# Patient Record
Sex: FEMALE | Race: BLACK OR AFRICAN AMERICAN | NOT HISPANIC OR LATINO | Employment: FULL TIME | URBAN - METROPOLITAN AREA
[De-identification: names, ages, dates, MRNs, and addresses within clinical notes are randomized per-mention and may not be internally consistent; named-entity substitution may affect disease eponyms.]

---

## 2017-01-04 ENCOUNTER — OFFICE VISIT (OUTPATIENT)
Dept: RETAIL CLINIC | Facility: CLINIC | Age: 32
End: 2017-01-04

## 2017-01-04 VITALS
OXYGEN SATURATION: 97 % | HEART RATE: 78 BPM | RESPIRATION RATE: 18 BRPM | TEMPERATURE: 98 F | DIASTOLIC BLOOD PRESSURE: 82 MMHG | SYSTOLIC BLOOD PRESSURE: 120 MMHG

## 2017-01-04 DIAGNOSIS — J20.9 ACUTE BRONCHITIS, UNSPECIFIED ORGANISM: ICD-10-CM

## 2017-01-04 DIAGNOSIS — H65.02 ACUTE SEROUS OTITIS MEDIA OF LEFT EAR, RECURRENCE NOT SPECIFIED: Primary | ICD-10-CM

## 2017-01-04 PROCEDURE — 99213 OFFICE O/P EST LOW 20 MIN: CPT | Performed by: NURSE PRACTITIONER

## 2017-01-04 RX ORDER — METHYLPREDNISOLONE 4 MG/1
TABLET ORAL
Qty: 21 TABLET | Refills: 0 | Status: SHIPPED | OUTPATIENT
Start: 2017-01-04 | End: 2017-08-08

## 2017-01-04 RX ORDER — ALBUTEROL SULFATE 90 UG/1
2 AEROSOL, METERED RESPIRATORY (INHALATION) EVERY 4 HOURS PRN
Qty: 1 INHALER | Refills: 11 | Status: SHIPPED | OUTPATIENT
Start: 2017-01-04 | End: 2017-02-03

## 2017-01-04 RX ORDER — DEXTROMETHORPHAN HYDROBROMIDE AND PROMETHAZINE HYDROCHLORIDE 15; 6.25 MG/5ML; MG/5ML
5 SYRUP ORAL 4 TIMES DAILY PRN
Qty: 118 ML | Refills: 0 | Status: SHIPPED | OUTPATIENT
Start: 2017-01-04 | End: 2017-01-11

## 2017-01-04 RX ORDER — AZITHROMYCIN 250 MG/1
TABLET, FILM COATED ORAL
Qty: 6 TABLET | Refills: 0 | Status: SHIPPED | OUTPATIENT
Start: 2017-01-04 | End: 2017-08-08

## 2017-01-04 NOTE — MR AVS SNAPSHOT
Barbie MERCED Taylor   1/4/2017 3:00 PM   Office Visit    Dept Phone:  [phone]   Encounter #:  [id number]    Provider:  PROVIDER JOSE ANGEL CHAVEZ   Department:  [facility]                Your Full Care Plan              Today's Medication Changes          These changes are accurate as of: 1/4/17  3:20 PM.  If you have any questions, ask your nurse or doctor.               New Medication(s)Ordered:     albuterol 108 (90 BASE) MCG/ACT inhaler   Commonly known as:  PROVENTIL HFA;VENTOLIN HFA   Inhale 2 puffs Every 4 (Four) Hours As Needed for wheezing for up to 30 days.       azithromycin 250 MG tablet   Commonly known as:  ZITHROMAX Z-MORENITA   Take 2 tablets the first day, then 1 tablet daily for 4 days.       MethylPREDNISolone 4 MG tablet   Commonly known as:  MEDROL (MORENITA)   Take as directed on package instructions.       promethazine-dextromethorphan 6.25-15 MG/5ML syrup   Commonly known as:  PROMETHAZINE-DM   Take 5 mL by mouth 4 (Four) Times a Day As Needed for cough for up to 7 days.            Where to Get Your Medications      These medications were sent to [address] [facility] [phone] [place] [phone]   [address] [place] [address]     Phone:  [phone]     albuterol 108 (90 BASE) MCG/ACT inhaler    azithromycin 250 MG tablet    MethylPREDNISolone 4 MG tablet    promethazine-dextromethorphan 6.25-15 MG/5ML syrup                  Your Updated Medication List          This list is accurate as of: 1/4/17  3:20 PM.  Always use your most recent med list.                albuterol 108 (90 BASE) MCG/ACT inhaler   Commonly known as:  PROVENTIL HFA;VENTOLIN HFA   Inhale 2 puffs Every 4 (Four) Hours As Needed for wheezing for up to 30 days.       azithromycin 250 MG tablet   Commonly known as:  ZITHROMAX Z-MORENITA   Take 2 tablets the first day, then 1 tablet daily for 4 days.       ibuprofen 600 MG tablet   Commonly known as:   ADVIL,MOTRIN   1 tab every 6 hours       MethylPREDNISolone 4 MG tablet   Commonly known as:  MEDROL (MORENITA)   Take as directed on package instructions.       promethazine-dextromethorphan 6.25-15 MG/5ML syrup   Commonly known as:  PROMETHAZINE-DM   Take 5 mL by mouth 4 (Four) Times a Day As Needed for cough for up to 7 days.               You Were Diagnosed With        Codes Comments    Acute bronchiolitis due to unspecified organism    -  Primary ICD-10-CM: J21.9     Acute serous otitis media of left ear, recurrence not specified     ICD-10-CM: H65.02  ICD-9-CM: 381.01       Instructions     None    Patient Instructions History      Upcoming Appointments     Visit Type Date Time Department    OFFICE VISIT 2017  3:00 PM [facility]      [employer] Signup     [facility] [employer] allows you to send messages to your doctor, view your test results, renew your prescriptions, schedule appointments, and more. To sign up, go to [employer] and click on the Sign Up Now link in the New User? box. Enter your [employer] Activation Code exactly as it appears below along with the last four digits of your Social Security Number and your Date of Birth () to complete the sign-up process. If you do not sign up before the expiration date, you must request a new code.    [employer] Activation Code: [email or web address]  Expires: 2017  3:20 PM    If you have questions, you can email [email or web address] or call [phone] to talk to our [employer] staff. Remember, [employer] is NOT to be used for urgent needs. For medical emergencies, dial 911.               Other Info from Your Visit           Allergies     No Known Allergies      Vital Signs     Blood Pressure Pulse Temperature Respirations Oxygen Saturation Smoking Status    120/82 (BP Location: Right arm) 78 98 °F (36.7 °C) (Oral) 18 97% Passive Smoke Exposure - Never Smoker      Problems and Diagnoses Noted     Infection of airways    -  Primary     Middle ear infection

## 2017-01-04 NOTE — PROGRESS NOTES
Subjective   Barbie Taylor is a 31 y.o. female. Presenting with cold like symptoms since yesterday. States the symptoms originally began at the end of December. Was exposed to Flu with daughter; Bronchitis with patient at work. She has been taking rescue inhaler that was previously, sudafed and robitussin with no improvement. Believes that she ran a fever yesterday. See ROS.   History of Present Illness     The following portions of the patient's history were reviewed and updated as appropriate: allergies, current medications, past family history, past medical history, past social history, past surgical history and problem list.    Review of Systems   Constitutional: Positive for appetite change, fatigue and fever (subjective). Negative for chills.   HENT: Positive for congestion, ear pain, postnasal drip, rhinorrhea and sinus pressure.    Eyes: Positive for discharge (watery ).   Gastrointestinal: Negative.    Neurological: Positive for headaches.       Objective   Physical Exam   Constitutional: She appears well-developed.   HENT:   Head: Normocephalic.   Right Ear: Tympanic membrane and ear canal normal.   Left Ear: Tympanic membrane is erythematous and bulging.   Nose: Mucosal edema present.   Mouth/Throat: Uvula is midline and mucous membranes are normal. Posterior oropharyngeal erythema present. No tonsillar exudate.   Cardiovascular: Normal rate and regular rhythm.    Pulmonary/Chest: Effort normal and breath sounds normal.   Lymphadenopathy:        Head (right side): No tonsillar adenopathy present.        Head (left side): No tonsillar adenopathy present.     She has no cervical adenopathy.   Neurological: She is alert.   Skin: Skin is warm, dry and intact.       Assessment/Plan   Diagnoses and all orders for this visit:    Acute bronchiolitis due to unspecified organism    Acute serous otitis media of left ear, recurrence not specified    Other orders  -     azithromycin (ZITHROMAX Z-MORENITA) 250 MG  tablet; Take 2 tablets the first day, then 1 tablet daily for 4 days.  -     MethylPREDNISolone (MEDROL, MORENITA,) 4 MG tablet; Take as directed on package instructions.  -     albuterol (PROVENTIL HFA;VENTOLIN HFA) 108 (90 BASE) MCG/ACT inhaler; Inhale 2 puffs Every 4 (Four) Hours As Needed for wheezing for up to 30 days.  -     promethazine-dextromethorphan (PROMETHAZINE-DM) 6.25-15 MG/5ML syrup; Take 5 mL by mouth 4 (Four) Times a Day As Needed for cough for up to 7 days.        Visit information reviewed with patient, including medication prescribed and patient instructions. All questions answered and given to patient/and or caregiver.     If symptoms worsen with fever >103, please seek further evaluation in the ER. Please F/U with PCP with concerns/and questions.     Cintia Marshall, APRN

## 2017-08-08 ENCOUNTER — OFFICE VISIT (OUTPATIENT)
Dept: RETAIL CLINIC | Facility: CLINIC | Age: 32
End: 2017-08-08

## 2017-08-08 VITALS
SYSTOLIC BLOOD PRESSURE: 112 MMHG | HEART RATE: 84 BPM | TEMPERATURE: 98.1 F | DIASTOLIC BLOOD PRESSURE: 72 MMHG | RESPIRATION RATE: 18 BRPM

## 2017-08-08 DIAGNOSIS — Z72.0 TOBACCO ABUSE: ICD-10-CM

## 2017-08-08 DIAGNOSIS — J06.9 ACUTE URI: Primary | ICD-10-CM

## 2017-08-08 PROCEDURE — 99213 OFFICE O/P EST LOW 20 MIN: CPT | Performed by: NURSE PRACTITIONER

## 2017-08-08 RX ORDER — AZITHROMYCIN 250 MG/1
TABLET, FILM COATED ORAL
Qty: 6 TABLET | Refills: 0 | Status: SHIPPED | OUTPATIENT
Start: 2017-08-08 | End: 2019-12-10 | Stop reason: ALTCHOICE

## 2017-08-08 RX ORDER — DEXTROMETHORPHAN HYDROBROMIDE AND PROMETHAZINE HYDROCHLORIDE 15; 6.25 MG/5ML; MG/5ML
5 SYRUP ORAL 2 TIMES DAILY
Qty: 5 ML | Refills: 0 | Status: SHIPPED | OUTPATIENT
Start: 2017-08-08 | End: 2017-08-15

## 2017-08-08 NOTE — PROGRESS NOTES
Subjective   Barbie Taylor is a 32 y.o. female.     HPI Comments: 3 days duration of congestion and cough. Temp yesterday 99.0. Using theraflu, mucinex, cough drops.        The following portions of the patient's history were reviewed and updated as appropriate: allergies, current medications, past family history, past medical history, past social history and past surgical history.    Review of Systems   Constitutional: Positive for appetite change, chills, fatigue and fever (99.0).   HENT: Positive for congestion, ear pain, sinus pressure and sore throat.    Respiratory: Positive for cough and wheezing. Negative for shortness of breath.        Objective   Physical Exam   Constitutional: She appears well-developed.   HENT:   Head: Normocephalic.   Right Ear: Tympanic membrane normal.   Left Ear: Tympanic membrane normal.   Nose: Mucosal edema present.   Mouth/Throat: Posterior oropharyngeal erythema present.   Eyes: Pupils are equal, round, and reactive to light.   Neck: Normal range of motion.   Cardiovascular: Normal rate and regular rhythm.    Pulmonary/Chest: Effort normal and breath sounds normal.       Assessment/Plan   Diagnoses and all orders for this visit:    Acute URI    Tobacco abuse    Other orders  -     azithromycin (ZITHROMAX) 250 MG tablet; Take 2 tablets the first day, then 1 tablet daily for 4 days.  -     promethazine-dextromethorphan (PROMETHAZINE-DM) 6.25-15 MG/5ML syrup; Take 5 mL by mouth 2 (Two) Times a Day for 7 days.

## 2019-12-10 ENCOUNTER — OFFICE VISIT (OUTPATIENT)
Dept: OBSTETRICS AND GYNECOLOGY | Facility: CLINIC | Age: 34
End: 2019-12-10

## 2019-12-10 VITALS
DIASTOLIC BLOOD PRESSURE: 74 MMHG | WEIGHT: 145 LBS | SYSTOLIC BLOOD PRESSURE: 120 MMHG | RESPIRATION RATE: 14 BRPM | BODY MASS INDEX: 24.89 KG/M2

## 2019-12-10 DIAGNOSIS — Z01.419 WELL WOMAN EXAM WITH ROUTINE GYNECOLOGICAL EXAM: Primary | ICD-10-CM

## 2019-12-10 DIAGNOSIS — Z30.431 IUD CHECK UP: ICD-10-CM

## 2019-12-10 DIAGNOSIS — L70.8 OTHER ACNE: ICD-10-CM

## 2019-12-10 PROCEDURE — 99395 PREV VISIT EST AGE 18-39: CPT | Performed by: OBSTETRICS & GYNECOLOGY

## 2019-12-10 NOTE — PROGRESS NOTES
Subjective   Chief Complaint   Patient presents with   • Gynecologic Exam     Having skin issues would like a referral to see Derm     Barbie BLACKWOOD Claudia is a 34 y.o. year old  presenting to be seen for her annual exam.  Patient is using Mirena IUD for birth control.  This needs to be replaced in February and she will return and have it removed and replaced.  She is having no GYN problems.  Patient wants a referral to dermatology for her acne.  The IUD as a source of the acne was discussed.    SEXUAL Hx:  She is currently sexually active.  In the past year there has not been new sexual partners.    Condoms are not typically used.  She would not like to be screened for STD's at today's exam.  Current birth control method: IUD - Mirena.  She is happy with her current method of contraception and does not want to discuss alternative methods of contraception.  MENSTRUAL Hx:  No LMP recorded (lmp unknown). (Menstrual status: Oral contraceptives).  In the past 6 months her cycles have been absent.  Her menstrual flow has been absent.   Each month on average there are roughly 0 day(s) of very heavy flow.    Intermenstrual bleeding is absent.    Post-coital bleeding is absent.  Dysmenorrhea: none  PMS: none  Her cycles are not a source of concern for her that she wishes to discuss today.  HEALTH Hx:  She exercises regularly:yes.  She wears her seat belt:yes.  She has concerns about domestic violence: no.  OTHER COMPLAINTS:  Nothing else    The following portions of the patient's history were reviewed and updated as appropriate:problem list, current medications, allergies, past family history, past medical history, past social history and past surgical history.    Social History    Tobacco Use      Smoking status: Former Smoker        Types: Cigarettes        Quit date:         Years since quittin.9      Smokeless tobacco: Never Used    Review of Systems  Review of Systems - History obtained from the  patient  General ROS: negative  Psychological ROS: negative  ENT ROS: negative  Allergy and Immunology ROS: negative  Hematological and Lymphatic ROS: negative  Endocrine ROS: negative  Breast ROS: negative for breast lumps  Respiratory ROS: no cough, shortness of breath, or wheezing  Cardiovascular ROS: no chest pain or dyspnea on exertion  Gastrointestinal ROS: no abdominal pain, change in bowel habits, or black or bloody stools  Genito-Urinary ROS: no dysuria, trouble voiding, or hematuria  Musculoskeletal ROS: negative  Neurological ROS: no TIA or stroke symptoms  Dermatological ROS: negative        Objective   /74   Resp 14   Wt 65.8 kg (145 lb)   LMP  (LMP Unknown) Comment: Mirena IUD  Breastfeeding No   BMI 24.89 kg/m²      General:  well developed; well nourished  no acute distress   Skin:  No suspicious lesions seen   Thyroid: normal to inspection and palpation   Breasts:  Examined in supine position  Symmetric without masses or skin dimpling  Nipples normal without inversion, lesions or discharge  There are no palpable axillary nodes   Abdomen: soft, non-tender; no masses  no umbilical or inguinal hernias are present  no hepato-splenomegaly   Heart: regular rate and rhythm, S1, S2 normal, no murmur, click, rub or gallop   Lungs: clear to auscultation   Pelvis: Clinical staff was present for exam  External genitalia:  normal appearance of the external genitalia including Bartholin's and Chamblee's glands.  :  urethral meatus normal;  Vaginal:  normal pink mucosa without prolapse or lesions.  Cervix:  normal appearance. IUD string present - 1 cms in length; Pap smear was done  Uterus:  normal size, shape and consistency. anteverted;  Adnexa:  normal bimanual exam of the adnexa.  Rectal:  digital rectal exam not performed; anus visually normal appearing.          Assessment/Plan   Barbie was seen today for gynecologic exam.    Diagnoses and all orders for this visit:    Well woman exam with  routine gynecological exam  -     Liquid-based Pap Smear, Screening    IUD check up    Other acne  -     Ambulatory Referral to Dermatology         Return in February 2020 for removal and replacement of Mirena IUD    Addison Michael MD  12/10/2019  This note was electronically signed.    Addison Michael MD   December 10, 2019

## 2020-03-16 ENCOUNTER — TELEPHONE (OUTPATIENT)
Dept: OBSTETRICS AND GYNECOLOGY | Facility: CLINIC | Age: 35
End: 2020-03-16

## 2020-03-17 ENCOUNTER — TELEPHONE (OUTPATIENT)
Dept: OBSTETRICS AND GYNECOLOGY | Facility: CLINIC | Age: 35
End: 2020-03-17

## 2020-03-26 ENCOUNTER — OFFICE VISIT (OUTPATIENT)
Dept: OBSTETRICS AND GYNECOLOGY | Facility: CLINIC | Age: 35
End: 2020-03-26

## 2020-03-26 VITALS
DIASTOLIC BLOOD PRESSURE: 62 MMHG | BODY MASS INDEX: 25.94 KG/M2 | HEIGHT: 63 IN | SYSTOLIC BLOOD PRESSURE: 110 MMHG | WEIGHT: 146.4 LBS | RESPIRATION RATE: 14 BRPM

## 2020-03-26 DIAGNOSIS — Z30.430 ENCOUNTER FOR IUD INSERTION: ICD-10-CM

## 2020-03-26 DIAGNOSIS — Z30.432 ENCOUNTER FOR IUD REMOVAL: Primary | ICD-10-CM

## 2020-03-26 PROCEDURE — 58300 INSERT INTRAUTERINE DEVICE: CPT | Performed by: OBSTETRICS & GYNECOLOGY

## 2020-03-26 PROCEDURE — 58301 REMOVE INTRAUTERINE DEVICE: CPT | Performed by: OBSTETRICS & GYNECOLOGY

## 2020-03-26 NOTE — PROGRESS NOTES
IUD Removal and Immediate Reinsertion    No LMP recorded. Patient has had an implant.    Date of procedure:  3/26/2020    Risks and benefits discussed? yes  All questions answered? yes  Consents given by the patient  Written consent obtained? yes  Reason for removal: Device expiration    Local anesthesia used:  yes -gel was applied to the cervix      Procedure documentation:    A speculum was placed in order to view the cervix.  A tenaculum did not need to be placed on the anterior cervical lip.  Cervical dilation did not need to be performed in order to access the string.  The IUD string was easily seen.  The string was grasped and the IUD was removed without difficulty.  The IUD did not appear to be adherent to the uterine cavity. It was removed intact.    A sterile speculum was replaced and the cervix was cleansed with an antiseptic solution.  Vaginal discharge was scant.  The anterior lip of the cervix was grasped with a tenaculum and the uterine cavity was gently sounded.  There was mild difficulty passing the sound through the cervix.  Cervical dilation did need to be performed prior to placing the IUD.  The uterus was anteverted and sounded to 6 cms.  The Mirena was then prepared per the manufacturers instructions.    The Mirena was advanced to a point 2 cms from the fundus and then the arms were released from the sheath.  The device was advanced to the fundus and the device was released fully from the sheath.. The string was cut 2 cms in length.  Bleeding from the cervix was scant.    She tolerated the procedure without any difficulty.     Post procedure instructions: It was reviewed that the Mirena will not alter the timing of when she bleeds but it may decrease the quantity of flow and cramps.  Roughly 30% of people will be amenorrheic over time.  Efficacy rate of 99.2% over 5 years was discussed.  Spontaneous expulsion rate of 1-2% was also discussed.  If she has any issue with fever or excessive bleeding  or pain she is to call the office immediately.  Otherwise I would like to see her back in 4 weeks with an ultrasound to confirm correct placement.    This note was electronically signed.    Addison Michael MD    March 26, 2020

## 2020-05-29 ENCOUNTER — TELEPHONE (OUTPATIENT)
Dept: OBSTETRICS AND GYNECOLOGY | Facility: CLINIC | Age: 35
End: 2020-05-29

## 2020-06-09 ENCOUNTER — TELEPHONE (OUTPATIENT)
Dept: OBSTETRICS AND GYNECOLOGY | Facility: CLINIC | Age: 35
End: 2020-06-09

## 2023-04-14 ENCOUNTER — PATIENT ROUNDING (BHMG ONLY) (OUTPATIENT)
Dept: FAMILY MEDICINE CLINIC | Facility: CLINIC | Age: 38
End: 2023-04-14
Payer: COMMERCIAL

## 2023-04-14 NOTE — PROGRESS NOTES
[facility] a My-Chart message has been sent to the patient for PATIENT ROUNDING with a My chart message

## 2024-02-16 ENCOUNTER — PATIENT ROUNDING (BHMG ONLY) (OUTPATIENT)
Dept: FAMILY MEDICINE CLINIC | Facility: CLINIC | Age: 39
End: 2024-02-16
Payer: COMMERCIAL

## 2024-08-21 ENCOUNTER — OFFICE VISIT (OUTPATIENT)
Dept: FAMILY MEDICINE CLINIC | Facility: CLINIC | Age: 39
End: 2024-08-21
Payer: COMMERCIAL

## 2024-08-21 ENCOUNTER — LAB (OUTPATIENT)
Dept: LAB | Facility: HOSPITAL | Age: 39
End: 2024-08-21
Payer: COMMERCIAL

## 2024-08-21 VITALS
DIASTOLIC BLOOD PRESSURE: 88 MMHG | OXYGEN SATURATION: 98 % | BODY MASS INDEX: 23.92 KG/M2 | HEIGHT: 63 IN | WEIGHT: 135 LBS | SYSTOLIC BLOOD PRESSURE: 136 MMHG | HEART RATE: 77 BPM

## 2024-08-21 DIAGNOSIS — Z12.31 ENCOUNTER FOR SCREENING MAMMOGRAM FOR MALIGNANT NEOPLASM OF BREAST: ICD-10-CM

## 2024-08-21 DIAGNOSIS — R55 PRE-SYNCOPE: ICD-10-CM

## 2024-08-21 DIAGNOSIS — R42 DIZZINESS: ICD-10-CM

## 2024-08-21 DIAGNOSIS — Z76.89 ENCOUNTER TO ESTABLISH CARE: Primary | ICD-10-CM

## 2024-08-21 DIAGNOSIS — R71.8 ELEVATED MCV: ICD-10-CM

## 2024-08-21 DIAGNOSIS — Z13.220 SCREENING FOR LIPID DISORDERS: ICD-10-CM

## 2024-08-21 DIAGNOSIS — R00.2 PALPITATIONS: ICD-10-CM

## 2024-08-21 DIAGNOSIS — Z76.89 ENCOUNTER TO ESTABLISH CARE: ICD-10-CM

## 2024-08-21 LAB
ALBUMIN SERPL-MCNC: 4.6 G/DL (ref 3.5–5.2)
ALBUMIN/GLOB SERPL: 1.4 G/DL
ALP SERPL-CCNC: 69 U/L (ref 39–117)
ALT SERPL W P-5'-P-CCNC: 20 U/L (ref 1–33)
ANION GAP SERPL CALCULATED.3IONS-SCNC: 10.2 MMOL/L (ref 5–15)
AST SERPL-CCNC: 21 U/L (ref 1–32)
BASOPHILS # BLD AUTO: 0.04 10*3/MM3 (ref 0–0.2)
BASOPHILS NFR BLD AUTO: 0.6 % (ref 0–1.5)
BILIRUB SERPL-MCNC: 0.3 MG/DL (ref 0–1.2)
BUN SERPL-MCNC: 12 MG/DL (ref 6–20)
BUN/CREAT SERPL: 13.3 (ref 7–25)
CALCIUM SPEC-SCNC: 9.6 MG/DL (ref 8.6–10.5)
CHLORIDE SERPL-SCNC: 103 MMOL/L (ref 98–107)
CHOLEST SERPL-MCNC: 188 MG/DL (ref 0–200)
CO2 SERPL-SCNC: 23.8 MMOL/L (ref 22–29)
CREAT SERPL-MCNC: 0.9 MG/DL (ref 0.57–1)
DEPRECATED RDW RBC AUTO: 40.8 FL (ref 37–54)
EGFRCR SERPLBLD CKD-EPI 2021: 83.1 ML/MIN/1.73
EOSINOPHIL # BLD AUTO: 0.42 10*3/MM3 (ref 0–0.4)
EOSINOPHIL NFR BLD AUTO: 6.3 % (ref 0.3–6.2)
ERYTHROCYTE [DISTWIDTH] IN BLOOD BY AUTOMATED COUNT: 10.9 % (ref 12.3–15.4)
FERRITIN SERPL-MCNC: 300 NG/ML (ref 13–150)
GLOBULIN UR ELPH-MCNC: 3.2 GM/DL
GLUCOSE SERPL-MCNC: 99 MG/DL (ref 65–99)
HBA1C MFR BLD: 4.6 % (ref 4.8–5.6)
HCT VFR BLD AUTO: 36.8 % (ref 34–46.6)
HDLC SERPL QL: 2.58
HDLC SERPL-MCNC: 73 MG/DL (ref 40–60)
HGB BLD-MCNC: 12.2 G/DL (ref 12–15.9)
IMM GRANULOCYTES # BLD AUTO: 0.01 10*3/MM3 (ref 0–0.05)
IMM GRANULOCYTES NFR BLD AUTO: 0.2 % (ref 0–0.5)
IRON 24H UR-MRATE: 38 MCG/DL (ref 37–145)
IRON SATN MFR SERPL: 11 % (ref 20–50)
LDLC SERPL CALC-MCNC: 102 MG/DL (ref 0–100)
LYMPHOCYTES # BLD AUTO: 3.1 10*3/MM3 (ref 0.7–3.1)
LYMPHOCYTES NFR BLD AUTO: 46.7 % (ref 19.6–45.3)
MCH RBC QN AUTO: 34.2 PG (ref 26.6–33)
MCHC RBC AUTO-ENTMCNC: 33.2 G/DL (ref 31.5–35.7)
MCV RBC AUTO: 103.1 FL (ref 79–97)
MONOCYTES # BLD AUTO: 0.58 10*3/MM3 (ref 0.1–0.9)
MONOCYTES NFR BLD AUTO: 8.7 % (ref 5–12)
NEUTROPHILS NFR BLD AUTO: 2.49 10*3/MM3 (ref 1.7–7)
NEUTROPHILS NFR BLD AUTO: 37.5 % (ref 42.7–76)
NRBC BLD AUTO-RTO: 0 /100 WBC (ref 0–0.2)
PLATELET # BLD AUTO: 252 10*3/MM3 (ref 140–450)
PMV BLD AUTO: 9.9 FL (ref 6–12)
POTASSIUM SERPL-SCNC: 3.9 MMOL/L (ref 3.5–5.2)
PROT SERPL-MCNC: 7.8 G/DL (ref 6–8.5)
RBC # BLD AUTO: 3.57 10*6/MM3 (ref 3.77–5.28)
SODIUM SERPL-SCNC: 137 MMOL/L (ref 136–145)
TIBC SERPL-MCNC: 356 MCG/DL (ref 298–536)
TRANSFERRIN SERPL-MCNC: 239 MG/DL (ref 200–360)
TRIGL SERPL-MCNC: 69 MG/DL (ref 0–150)
TSH SERPL DL<=0.05 MIU/L-ACNC: 2.39 UIU/ML (ref 0.27–4.2)
VLDLC SERPL-MCNC: 13 MG/DL (ref 5–40)
WBC NRBC COR # BLD AUTO: 6.64 10*3/MM3 (ref 3.4–10.8)

## 2024-08-21 PROCEDURE — 80061 LIPID PANEL: CPT

## 2024-08-21 PROCEDURE — 99204 OFFICE O/P NEW MOD 45 MIN: CPT | Performed by: STUDENT IN AN ORGANIZED HEALTH CARE EDUCATION/TRAINING PROGRAM

## 2024-08-21 PROCEDURE — 82607 VITAMIN B-12: CPT

## 2024-08-21 PROCEDURE — 82746 ASSAY OF FOLIC ACID SERUM: CPT

## 2024-08-21 PROCEDURE — 80050 GENERAL HEALTH PANEL: CPT

## 2024-08-21 PROCEDURE — 83540 ASSAY OF IRON: CPT

## 2024-08-21 PROCEDURE — 83036 HEMOGLOBIN GLYCOSYLATED A1C: CPT

## 2024-08-21 PROCEDURE — 36415 COLL VENOUS BLD VENIPUNCTURE: CPT

## 2024-08-21 PROCEDURE — 82728 ASSAY OF FERRITIN: CPT

## 2024-08-21 PROCEDURE — 84466 ASSAY OF TRANSFERRIN: CPT

## 2024-08-21 NOTE — PROGRESS NOTES
"    New Patient Office Visit      Date: 2024   Patient Name: Barbie Taylor  : 1985   MRN: [id number]     Chief Complaint:    Chief Complaint   Patient presents with    Establish Care     Patient needs iud removal     Fatigue    Dizziness    Insomnia       History of Present Illness: Barbie Taylor is a 39 y.o. female who is here today to establish care.      Subjective      HPI:  Patient presents today to establish care.    Had previously followed with OB/GYN for most of her primary care needs.  No chronic medical conditions.  Does not take any medications.   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    Would like to discuss recent episodes of intermittent dizziness.  Recalls initial episode happened on .  She was at a neighborhood [employer] and had sudden onset dizziness. States she \"just didn't feel right.\"  Told her fiancé that she needed to leave but on her way home she felt like she was going to faint so she sat down on the ground.  She did not lose consciousness.  Got up and walked home and when she got there she had vomiting episode.  Has also had worsening fatigue over last couple months.   She has not had any further episodes to the degree of the one described earlier, but has continued to experience intermittent dizziness a few times at work. It typically happens when she's up and moving around. Vision will become slightly blurry. Lasts a few seconds before she improves. Checked BP once during an episode and it was actually high (150s/90s). She has never been fasting during an episode but wonders if it could be her BS.   Has had some associated palpitations.     Has had Mirena for almost 5 years. Periods are fairly regular. First three years had no bleeding at all. The last two years has had some spotting and menses is slightly getting heavier.    Review of Systems:   Negative/not pertinent unless otherwise noted above in HPI. "     Past Medical History:   Past Medical History:   Diagnosis Date    Allergic     Anemia     Blurred vision     Dizziness        Past Surgical History:   Past Surgical History:   Procedure Laterality Date     SECTION      X2        Family History: History reviewed. No pertinent family history.    Social History:   Social History     Socioeconomic History    Marital status: Single   Tobacco Use    Smoking status: Former     Current packs/day: 0.00     Types: Cigarettes     Quit date:      Years since quitting: 10.6    Smokeless tobacco: Never   Vaping Use    Vaping status: Some Days    Substances: Nicotine   Substance and Sexual Activity    Alcohol use: Yes    Drug use: No    Sexual activity: Yes     Partners: Male     Birth control/protection: I.U.D.     Comment: Mirena IUD       Medications:   No current outpatient medications on file.    Allergies:   Allergies   Allergen Reactions    Latex Unknown - Low Severity     Patient is unknown of reaction        Immunizations:  Immunization History   Administered Date(s) Administered    COVID-19 (PFIZER) Purple Cap Monovalent 2021, 10/19/2021    DTP 1985, 1984, 1984, 1985    DTaP / Hep B / IPV 2008    Fluzone (or Fluarix & Flulaval for VFC) >6mos 2019, 10/12/2021, 10/23/2022, 10/17/2023    Hepatitis B 2 Dose Vaccine Heplisav-B 2024    Hepatitis B Adult/Adolescent IM 2003, 2019, 2020    Influenza, Unspecified 2019    MMR 1985    PEDS-Pneumococcal Conjugate (PCV7) 2008    PPD Test 2003, 2003, 2004    Polio, Unspecified 1984, 1984, 1985, 1985    Tdap 2019    Varicella 10/17/2023       Pap:  DUE  Last Completed Pap Smear       This patient has no relevant Health Maintenance data.           Mammogram:  Never done  Last Completed Mammogram       This patient has no relevant Health Maintenance data.              Tobacco Use: Medium Risk  "(8/21/2024)    Patient History     Smoking Tobacco Use: Former     Smokeless Tobacco Use: Never     Passive Exposure: Not on file       Social History     Substance and Sexual Activity   Alcohol Use Yes        Social History     Substance and Sexual Activity   Drug Use No        Diet/Physical activity: Healthy, occasional sugary snacks. counseled on 08/21/24      Sexual Health: using contraception, Mirena. See HPI for menstrual history.    PHQ-2 Depression Screening  Little interest or pleasure in doing things? 0-->not at all   Feeling down, depressed, or hopeless? 0-->not at all   PHQ-2 Total Score 0     PHQ-9 Total Score: 0       Objective     Physical Exam:  Vital Signs:   Vitals:    08/21/24 1006   BP: 136/88   BP Location: Left arm   Patient Position: Sitting   Cuff Size: Adult   Pulse: 77   SpO2: 98%   Weight: 61.2 kg (135 lb)   Height: 160 cm (63\")     Body mass index is 23.91 kg/m².    Physical Exam  Constitutional:       Appearance: She is normal weight. She is not ill-appearing.   HENT:      Head: Normocephalic and atraumatic.      Mouth/Throat:      Mouth: Mucous membranes are moist.      Pharynx: Oropharynx is clear. No oropharyngeal exudate or posterior oropharyngeal erythema.   Eyes:      Extraocular Movements: Extraocular movements intact.      Conjunctiva/sclera: Conjunctivae normal.   Cardiovascular:      Rate and Rhythm: Normal rate and regular rhythm.      Heart sounds: Normal heart sounds.   Pulmonary:      Breath sounds: Normal breath sounds. No wheezing or rhonchi.   Musculoskeletal:         General: Normal range of motion.      Cervical back: Normal range of motion and neck supple.   Lymphadenopathy:      Cervical: No cervical adenopathy.   Neurological:      General: No focal deficit present.      Mental Status: She is alert.   Psychiatric:         Mood and Affect: Mood normal.         Thought Content: Thought content normal.             Assessment / Plan      Assessment/Plan:   Diagnoses and " all orders for this visit:    1. Encounter to establish care (Primary)  -     Comprehensive Metabolic Panel; Future  -     CBC & Differential; Future  -     Lipid Panel With / Chol / HDL Ratio; Future  -     TSH Rfx On Abnormal To Free T4; Future  -     Hemoglobin A1c; Future    2. Dizziness  3. Pre-syncope  Intermittent episodes of dizziness, palpitations over the last 1.5 months.  No LOC.  Reviewed differential for presyncopal episodes including orthostatic hypotension, vasovagal hypotension, cardiogenic and neurogenic causes.  Patient is low risk for cardiogenic or neurogenic etiology given general good health.  Will check labs as below for further workup.  Orthostatic vital signs were not quite positive in office today although systolic blood pressure did drop 10 points from sitting to standing.  Advised patient to keep symptom diary over the next 4 weeks and make sure to stay well-hydrated.  Avoid prolonged fasting..  Given the palpitations Holter monitor was also ordered.  If results are negative, could consider echocardiogram in the future.  Also advised to avoid overly sweet/sugary junk foods. Incorporate more protein into diet.  -     Comprehensive Metabolic Panel; Future  -     CBC & Differential; Future  -     TSH Rfx On Abnormal To Free T4; Future  -     Hemoglobin A1c; Future  -     Ferritin; Future  -     Iron Profile; Future  -     Holter monitor - 48 hour; Future    4. Palpitations  -     Holter monitor - 48 hour; Future    5. Screening for lipid disorders  -     Lipid Panel With / Chol / HDL Ratio; Future    6. Encounter for screening mammogram for malignant neoplasm of breast  -     Mammo Screening Digital Tomosynthesis Bilateral With CAD; Future        Healthcare Maintenance:  Counseling provided based on age appropriate USPSTF guidelines.  BMI is within normal parameters. No other follow-up for BMI required.      Barbie Taylor voices understanding and acceptance of this advice and will call  back with any further questions or concerns. AVS with preventive healthcare tips printed for patient.         Follow Up:   Return in about 4 weeks (around 9/18/2024) for FU dizziness.        Cherelle Hawkins DO  [facility] [address]

## 2024-08-22 ENCOUNTER — PATIENT ROUNDING (BHMG ONLY) (OUTPATIENT)
Dept: FAMILY MEDICINE CLINIC | Facility: CLINIC | Age: 39
End: 2024-08-22
Payer: COMMERCIAL

## 2024-08-24 DIAGNOSIS — R71.8 ELEVATED MCV: Primary | ICD-10-CM

## 2024-08-24 DIAGNOSIS — R79.89 ABNORMAL CBC: ICD-10-CM

## 2024-08-24 DIAGNOSIS — R79.89 ELEVATED FERRITIN: ICD-10-CM

## 2024-08-24 LAB
FOLATE SERPL-MCNC: 11.1 NG/ML (ref 4.78–24.2)
VIT B12 BLD-MCNC: 505 PG/ML (ref 211–946)

## 2024-09-16 ENCOUNTER — OFFICE VISIT (OUTPATIENT)
Dept: FAMILY MEDICINE CLINIC | Facility: CLINIC | Age: 39
End: 2024-09-16
Payer: COMMERCIAL

## 2024-09-16 ENCOUNTER — LAB (OUTPATIENT)
Dept: LAB | Facility: HOSPITAL | Age: 39
End: 2024-09-16
Payer: COMMERCIAL

## 2024-09-16 VITALS
BODY MASS INDEX: 24.88 KG/M2 | DIASTOLIC BLOOD PRESSURE: 70 MMHG | HEART RATE: 63 BPM | WEIGHT: 140.4 LBS | HEIGHT: 63 IN | SYSTOLIC BLOOD PRESSURE: 116 MMHG | OXYGEN SATURATION: 97 %

## 2024-09-16 DIAGNOSIS — R79.89 ABNORMAL CBC: ICD-10-CM

## 2024-09-16 DIAGNOSIS — R71.8 ELEVATED MCV: ICD-10-CM

## 2024-09-16 DIAGNOSIS — R55 PRE-SYNCOPE: ICD-10-CM

## 2024-09-16 DIAGNOSIS — R42 DIZZINESS: Primary | ICD-10-CM

## 2024-09-16 DIAGNOSIS — R00.2 PALPITATIONS: ICD-10-CM

## 2024-09-16 DIAGNOSIS — R79.89 ELEVATED FERRITIN: Primary | ICD-10-CM

## 2024-09-16 DIAGNOSIS — R79.89 ELEVATED FERRITIN: ICD-10-CM

## 2024-09-16 LAB
BASOPHILS # BLD AUTO: 0.05 10*3/MM3 (ref 0–0.2)
BASOPHILS NFR BLD AUTO: 0.7 % (ref 0–1.5)
DEPRECATED RDW RBC AUTO: 43.5 FL (ref 37–54)
EOSINOPHIL # BLD AUTO: 0.6 10*3/MM3 (ref 0–0.4)
EOSINOPHIL NFR BLD AUTO: 8.9 % (ref 0.3–6.2)
ERYTHROCYTE [DISTWIDTH] IN BLOOD BY AUTOMATED COUNT: 11.1 % (ref 12.3–15.4)
FERRITIN SERPL-MCNC: 322 NG/ML (ref 13–150)
HCT VFR BLD AUTO: 35 % (ref 34–46.6)
HGB BLD-MCNC: 11.6 G/DL (ref 12–15.9)
IMM GRANULOCYTES # BLD AUTO: 0.02 10*3/MM3 (ref 0–0.05)
IMM GRANULOCYTES NFR BLD AUTO: 0.3 % (ref 0–0.5)
LYMPHOCYTES # BLD AUTO: 2.31 10*3/MM3 (ref 0.7–3.1)
LYMPHOCYTES NFR BLD AUTO: 34.1 % (ref 19.6–45.3)
MCH RBC QN AUTO: 35.3 PG (ref 26.6–33)
MCHC RBC AUTO-ENTMCNC: 33.1 G/DL (ref 31.5–35.7)
MCV RBC AUTO: 106.4 FL (ref 79–97)
MONOCYTES # BLD AUTO: 0.53 10*3/MM3 (ref 0.1–0.9)
MONOCYTES NFR BLD AUTO: 7.8 % (ref 5–12)
NEUTROPHILS NFR BLD AUTO: 3.26 10*3/MM3 (ref 1.7–7)
NEUTROPHILS NFR BLD AUTO: 48.2 % (ref 42.7–76)
NRBC BLD AUTO-RTO: 0 /100 WBC (ref 0–0.2)
PLATELET # BLD AUTO: 238 10*3/MM3 (ref 140–450)
PMV BLD AUTO: 10.1 FL (ref 6–12)
RBC # BLD AUTO: 3.29 10*6/MM3 (ref 3.77–5.28)
WBC NRBC COR # BLD AUTO: 6.77 10*3/MM3 (ref 3.4–10.8)

## 2024-09-16 PROCEDURE — 99214 OFFICE O/P EST MOD 30 MIN: CPT | Performed by: STUDENT IN AN ORGANIZED HEALTH CARE EDUCATION/TRAINING PROGRAM

## 2024-09-16 PROCEDURE — 85060 BLOOD SMEAR INTERPRETATION: CPT

## 2024-09-16 PROCEDURE — 85025 COMPLETE CBC W/AUTO DIFF WBC: CPT

## 2024-09-16 PROCEDURE — 82728 ASSAY OF FERRITIN: CPT

## 2024-09-17 LAB
CYTOLOGIST CVX/VAG CYTO: NORMAL
PATH INTERP BLD-IMP: NORMAL

## 2024-10-04 ENCOUNTER — LAB (OUTPATIENT)
Dept: LAB | Facility: HOSPITAL | Age: 39
End: 2024-10-04
Payer: COMMERCIAL

## 2024-10-04 DIAGNOSIS — R79.89 ELEVATED FERRITIN: ICD-10-CM

## 2024-10-04 DIAGNOSIS — R71.8 ELEVATED MCV: ICD-10-CM

## 2024-10-04 PROCEDURE — 83921 ORGANIC ACID SINGLE QUANT: CPT

## 2024-10-04 PROCEDURE — 82525 ASSAY OF COPPER: CPT

## 2024-10-09 LAB
COPPER SERPL-MCNC: 94 UG/DL (ref 80–158)
METHYLMALONATE SERPL-SCNC: 96 NMOL/L (ref 0–378)

## 2024-11-27 ENCOUNTER — HOSPITAL ENCOUNTER (OUTPATIENT)
Dept: MAMMOGRAPHY | Facility: HOSPITAL | Age: 39
Discharge: HOME | End: 2024-11-27
Admitting: STUDENT IN AN ORGANIZED HEALTH CARE EDUCATION/TRAINING PROGRAM
Payer: COMMERCIAL

## 2024-11-27 DIAGNOSIS — Z12.31 ENCOUNTER FOR SCREENING MAMMOGRAM FOR MALIGNANT NEOPLASM OF BREAST: ICD-10-CM

## 2024-11-27 LAB
NCCN CRITERIA FLAG: NORMAL
TYRER CUZICK SCORE: 10.2

## 2024-11-27 PROCEDURE — 77067 SCR MAMMO BI INCL CAD: CPT

## 2024-11-27 PROCEDURE — 77063 BREAST TOMOSYNTHESIS BI: CPT

## 2024-12-03 DIAGNOSIS — R92.2 INCONCLUSIVE MAMMOGRAM: Primary | ICD-10-CM

## 2025-03-12 ENCOUNTER — TELEPHONE (OUTPATIENT)
Dept: FAMILY MEDICINE CLINIC | Facility: CLINIC | Age: 40
End: 2025-03-12
Payer: COMMERCIAL

## 2025-03-12 DIAGNOSIS — Z30.432 AWAITING REMOVAL OF CONTRACEPTIVE INTRAUTERINE DEVICE (IUD): Primary | ICD-10-CM

## 2025-03-12 NOTE — TELEPHONE ENCOUNTER
Caller: Barbie Taylor    Relationship: Self    Best call back number: [phone]    What is the medical concern/diagnosis: NEEDS A MERENA RING REMOVED    What specialty or service is being requested: GYNECOLOGIST    What is the provider, practice or medical service name:  TO DECIDE, PATIENT WANTS TO STAY IN [facility] NETWORK        Any additional details: PLEASE CALL WITH ANY QUESTIONS OR WHEN COMPLETED.

## 2025-03-24 ENCOUNTER — LAB (OUTPATIENT)
Dept: LAB | Facility: HOSPITAL | Age: 40
End: 2025-03-24
Payer: COMMERCIAL

## 2025-03-24 ENCOUNTER — OFFICE VISIT (OUTPATIENT)
Dept: FAMILY MEDICINE CLINIC | Facility: CLINIC | Age: 40
End: 2025-03-24
Payer: COMMERCIAL

## 2025-03-24 VITALS
DIASTOLIC BLOOD PRESSURE: 74 MMHG | BODY MASS INDEX: 25.16 KG/M2 | SYSTOLIC BLOOD PRESSURE: 110 MMHG | WEIGHT: 142 LBS | HEIGHT: 63 IN

## 2025-03-24 DIAGNOSIS — Z13.220 SCREENING FOR LIPID DISORDERS: ICD-10-CM

## 2025-03-24 DIAGNOSIS — Z00.00 ANNUAL PHYSICAL EXAM: Primary | ICD-10-CM

## 2025-03-24 DIAGNOSIS — G56.02 CARPAL TUNNEL SYNDROME OF LEFT WRIST: ICD-10-CM

## 2025-03-24 DIAGNOSIS — R42 DIZZINESS: ICD-10-CM

## 2025-03-24 DIAGNOSIS — Z00.00 ANNUAL PHYSICAL EXAM: ICD-10-CM

## 2025-03-24 DIAGNOSIS — N89.8 VAGINAL DISCHARGE: ICD-10-CM

## 2025-03-24 DIAGNOSIS — H53.8 BLURRY VISION: ICD-10-CM

## 2025-03-24 LAB
ALBUMIN SERPL-MCNC: 4.3 G/DL (ref 3.5–5.2)
ALBUMIN/GLOB SERPL: 1.3 G/DL
ALP SERPL-CCNC: 73 U/L (ref 39–117)
ALT SERPL W P-5'-P-CCNC: 23 U/L (ref 1–33)
ANION GAP SERPL CALCULATED.3IONS-SCNC: 11 MMOL/L (ref 5–15)
AST SERPL-CCNC: 25 U/L (ref 1–32)
BASOPHILS # BLD AUTO: 0.05 10*3/MM3 (ref 0–0.2)
BASOPHILS NFR BLD AUTO: 0.6 % (ref 0–1.5)
BILIRUB SERPL-MCNC: 0.3 MG/DL (ref 0–1.2)
BUN SERPL-MCNC: 14 MG/DL (ref 6–20)
BUN/CREAT SERPL: 15.1 (ref 7–25)
CALCIUM SPEC-SCNC: 9.1 MG/DL (ref 8.6–10.5)
CHLORIDE SERPL-SCNC: 106 MMOL/L (ref 98–107)
CHOLEST SERPL-MCNC: 187 MG/DL (ref 0–200)
CO2 SERPL-SCNC: 21 MMOL/L (ref 22–29)
CREAT SERPL-MCNC: 0.93 MG/DL (ref 0.57–1)
DEPRECATED RDW RBC AUTO: 41.1 FL (ref 37–54)
EGFRCR SERPLBLD CKD-EPI 2021: 79.4 ML/MIN/1.73
EOSINOPHIL # BLD AUTO: 0.58 10*3/MM3 (ref 0–0.4)
EOSINOPHIL NFR BLD AUTO: 7.5 % (ref 0.3–6.2)
ERYTHROCYTE [DISTWIDTH] IN BLOOD BY AUTOMATED COUNT: 10.8 % (ref 12.3–15.4)
GLOBULIN UR ELPH-MCNC: 3.3 GM/DL
GLUCOSE SERPL-MCNC: 91 MG/DL (ref 65–99)
HBA1C MFR BLD: 4.8 % (ref 4.8–5.6)
HCT VFR BLD AUTO: 36.2 % (ref 34–46.6)
HDLC SERPL QL: 2.75
HDLC SERPL-MCNC: 68 MG/DL (ref 40–60)
HGB BLD-MCNC: 12 G/DL (ref 12–15.9)
IMM GRANULOCYTES # BLD AUTO: 0.01 10*3/MM3 (ref 0–0.05)
IMM GRANULOCYTES NFR BLD AUTO: 0.1 % (ref 0–0.5)
LDLC SERPL CALC-MCNC: 99 MG/DL (ref 0–100)
LYMPHOCYTES # BLD AUTO: 3.39 10*3/MM3 (ref 0.7–3.1)
LYMPHOCYTES NFR BLD AUTO: 43.6 % (ref 19.6–45.3)
MCH RBC QN AUTO: 34.8 PG (ref 26.6–33)
MCHC RBC AUTO-ENTMCNC: 33.1 G/DL (ref 31.5–35.7)
MCV RBC AUTO: 104.9 FL (ref 79–97)
MONOCYTES # BLD AUTO: 0.65 10*3/MM3 (ref 0.1–0.9)
MONOCYTES NFR BLD AUTO: 8.4 % (ref 5–12)
NEUTROPHILS NFR BLD AUTO: 3.1 10*3/MM3 (ref 1.7–7)
NEUTROPHILS NFR BLD AUTO: 39.8 % (ref 42.7–76)
NRBC BLD AUTO-RTO: 0 /100 WBC (ref 0–0.2)
PLATELET # BLD AUTO: 261 10*3/MM3 (ref 140–450)
PMV BLD AUTO: 9.8 FL (ref 6–12)
POTASSIUM SERPL-SCNC: 3.8 MMOL/L (ref 3.5–5.2)
PROT SERPL-MCNC: 7.6 G/DL (ref 6–8.5)
RBC # BLD AUTO: 3.45 10*6/MM3 (ref 3.77–5.28)
SODIUM SERPL-SCNC: 138 MMOL/L (ref 136–145)
TRIGL SERPL-MCNC: 116 MG/DL (ref 0–150)
TSH SERPL DL<=0.05 MIU/L-ACNC: 3.54 UIU/ML (ref 0.27–4.2)
VLDLC SERPL-MCNC: 20 MG/DL (ref 5–40)
WBC NRBC COR # BLD AUTO: 7.78 10*3/MM3 (ref 3.4–10.8)

## 2025-03-24 PROCEDURE — 83036 HEMOGLOBIN GLYCOSYLATED A1C: CPT

## 2025-03-24 PROCEDURE — 99214 OFFICE O/P EST MOD 30 MIN: CPT | Performed by: STUDENT IN AN ORGANIZED HEALTH CARE EDUCATION/TRAINING PROGRAM

## 2025-03-24 PROCEDURE — 80050 GENERAL HEALTH PANEL: CPT

## 2025-03-24 PROCEDURE — 99396 PREV VISIT EST AGE 40-64: CPT | Performed by: STUDENT IN AN ORGANIZED HEALTH CARE EDUCATION/TRAINING PROGRAM

## 2025-03-24 PROCEDURE — 80061 LIPID PANEL: CPT

## 2025-03-24 RX ORDER — DIPHENOXYLATE HYDROCHLORIDE AND ATROPINE SULFATE 2.5; .025 MG/1; MG/1
TABLET ORAL DAILY
COMMUNITY

## 2025-03-24 NOTE — ASSESSMENT & PLAN NOTE
Will discuss in further detail at future visits, but attached patient information regarding exercises and lifestyle modifications.

## 2025-03-24 NOTE — ASSESSMENT & PLAN NOTE
Orders:    LIQUID-BASED PAP SMEAR WITH HPV GENOTYPING REGARDLESS OF INTERPRETATION (ESTEVAN,COR,MAD); Future

## 2025-03-24 NOTE — PROGRESS NOTES
Chief Complaint   Patient presents with    Annual Exam       HPI:  Barbie Taylor is a 40 y.o. female who presents today for     Pap smear  2/2 Mirena not being removed?  OB/GYN referral isn't until 6/1/25, concerned  Foul discharge, usually associated with menstrual period, now more itching/burning smell  Usually just spotting, has gotten more frequent over the last several years. 5-year Mirena  Getting more yeast infections than recently  Taking daily probiotics  Last PCP retired in 2020/2021, well overdue for pap smear  Interested in Pap w/ HPV co-testing    Recently changed shower regimen--new soap?  Working to [employer]     Dizziness  Episode 2 weeks ago, sitting and charting when had blurry vision & dizziness  BP runs high during these episodes--150 systolic  IIH? Last eye exam was a while ago, hasn't seen a neurologist  Last episode prior was in the holidays, generally very infrequent  Singular episode of vomiting in July  Several episodes of work--spontaneous onset with no clear onset  Dizziness, increased fatigue/sleepiness,     Neuro referral  Dilated eye exam    Lump in left breast  BI-RADS 0 mammogram recently  Will follow-up with repeat mammogram    PE:  Vitals:    03/24/25 0755   BP: 110/74      Body mass index is 25.16 kg/m².    Physical Exam      Current Outpatient Medications   Medication Sig Dispense Refill    multivitamin (MULTIVITAMIN PO) Take  by mouth Daily.       No current facility-administered medications for this visit.        A/P:  Assessment & Plan  Annual physical exam    Orders:    Comprehensive Metabolic Panel; Future    CBC & Differential; Future    Lipid Panel With / Chol / HDL Ratio; Future    Screening for lipid disorders    Orders:    Lipid Panel With / Chol / HDL Ratio; Future    Hemoglobin A1c; Future    Dizziness    Orders:    TSH Rfx On Abnormal To Free T4; Future    Vaginal discharge    Orders:    LIQUID-BASED PAP SMEAR WITH HPV GENOTYPING REGARDLESS OF INTERPRETATION  (ESTEVAN,COR,MAD); Future    Blurry vision         Carpal tunnel syndrome of left wrist             Return in about 3 months (around 6/24/2025).

## 2025-03-24 NOTE — PROGRESS NOTES
Chief Complaint   Patient presents with    Annual Exam       HPI:  Barbie Taylor is a 40 y.o. female who presents today for annual exam with pap smear.    Vaginal discharge  Screening cervical cytology  Concern for BV 2/2 Mirena not being removed?  OB/GYN referral isn't until 6/1/25, concerned  Foul discharge, usually associated with menstrual period, now more itching/burning smell  White/thick creamy-like  Brown when dried, odorous & uncomfortable  Usually just spotting, has gotten more frequent over the last several years. 5-year Mirena  Getting more yeast infections than recently  Taking daily probiotics  Last PCP retired in 2020/2021, well overdue for pap smear  Interested in Pap w/ HPV co-testing  Recently changed shower regimen--new soap?    Dizziness w/ blurred vision  Episode 2 weeks ago, sitting and charting when had blurry vision & dizziness  Has checked BP a couple times during episodes, last reading was 150 systolic  IIH? Last eye exam was a while ago, hasn't seen a neurologist  Last episode prior was in the holidays, generally happening once in a month.  Singular episode of vomiting in July  Several episodes of work--spontaneous onset with no clear onset  Dizziness, increased fatigue/sleepiness, more blurry/lightheadedness than room spinning around her    C/f carpal tunnel of L wrist  Paresthesias and weakness in median nerve distribution  Exacerbated by Phalen's test  Will monitor and address at follow-up    Lump in left breast  BI-RADS 0 mammogram recently  Will follow-up with repeat mammogram    PE:  Vitals:    03/24/25 0755   BP: 110/74      Body mass index is 25.16 kg/m².    Physical Exam  Vitals reviewed. Exam conducted with a chaperone present.   Constitutional:       Appearance: Normal appearance. She is normal weight.   HENT:      Head: Normocephalic and atraumatic.      Right Ear: Tympanic membrane, ear canal and external ear normal.      Left Ear: Tympanic membrane, ear canal and  external ear normal.      Nose: Nose normal.      Mouth/Throat:      Mouth: Mucous membranes are moist.      Pharynx: Oropharynx is clear.   Eyes:      Extraocular Movements: Extraocular movements intact.      Conjunctiva/sclera: Conjunctivae normal.      Pupils: Pupils are equal, round, and reactive to light.   Cardiovascular:      Rate and Rhythm: Normal rate and regular rhythm.      Pulses: Normal pulses.      Heart sounds: Normal heart sounds.   Pulmonary:      Effort: Pulmonary effort is normal.      Breath sounds: Normal breath sounds.   Abdominal:      General: Abdomen is flat. Bowel sounds are normal.      Palpations: Abdomen is soft.   Genitourinary:     Exam position: Lithotomy position.      Jef stage (genital): 5.   Musculoskeletal:         General: Normal range of motion.      Cervical back: Normal range of motion and neck supple.   Lymphadenopathy:      Cervical: No cervical adenopathy.   Skin:     General: Skin is warm and dry.      Capillary Refill: Capillary refill takes less than 2 seconds.   Neurological:      General: No focal deficit present.      Mental Status: She is alert and oriented to person, place, and time. Mental status is at baseline.   Psychiatric:         Mood and Affect: Mood normal.         Behavior: Behavior normal.         Thought Content: Thought content normal.         Judgment: Judgment normal.         Current Outpatient Medications   Medication Sig Dispense Refill    multivitamin (MULTIVITAMIN PO) Take  by mouth Daily.       No current facility-administered medications for this visit.        A/P:  Assessment & Plan  Annual physical exam  Ordered routine annual labs [CBC, CMP, Lipid, TSH, A1c]  Counseled on lifestyle changes including diet and exercise  Routine screening cervical cytology w/ HPV co-testing  Orders:    Comprehensive Metabolic Panel; Future    CBC & Differential; Future    Lipid Panel With / Chol / HDL Ratio; Future    Vaginal discharge  Differential includes  "BV, chlamydia, gonorrhea, trichomonas. Discussed that this is not likely due to retained Mirena as recent guidelines state they can be retained for 5-7 years. She has follow-up scheduled with OB/GYN for removal on 6/1/25. Added STD panel to pap smear collection and will share results once returned.  Orders:    LIQUID-BASED PAP SMEAR WITH HPV GENOTYPING REGARDLESS OF INTERPRETATION (ESTEVAN,COR,MAD); Future    Dizziness  Patient is experiencing recurrent episodes of unprovoked dizziness, blurry vision, and extreme fatigue occurring roughly monthly. Cardiac workup including EKG and Holter monitor has been wnl. Concern for hypoglycemic episodes, first-break MS, other primary neurological concerns. 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   Blood work ordered today  Ordered MRI, referral to neurological for continued workup.  Orders:    TSH Rfx On Abnormal To Free T4; Future    MRI Brain Without Contrast; Future    Ambulatory Referral to Neurology    Blurry vision  Recommended dilated eye exam as it has been several years since last exam. See \"dizziness\" above for further info.  Orders:    MRI Brain Without Contrast; Future    Ambulatory Referral to Neurology    Carpal tunnel syndrome of left wrist  Will discuss in further detail at future visits, but attached patient information regarding exercises and lifestyle modifications.       Screening for lipid disorders  Very mildly elevated LDL to 102 on prior labs.  Orders:    Lipid Panel With / Chol / HDL Ratio; Future    Hemoglobin A1c; Future      Return in about 3 months (around 6/24/2025).     Jude Lauren MS3    I have seen and evaluated the patient with the medical student. I agree with the physical exam findings, assessment and plan as above. I personally performed a separate physical exam on the patient.     For her recurrent \"dizzy\" episodes it does seem more like lightheadedness. Thus far workup including " holter monitoring, Cbc, CMP, TSH, glucose have been normal. BP is at goal. I've asked her to check her glucose level during an acute episode to see if it could be related to hypoglycemia. She does have long periods of fasting while working and snacks are high in carbs/sugars which could be contributing.   Given the LUE numbness and associated vision blurring will proceed w neurologic workup including MRI and neurology referral.    Cherelle Hawkins, DO

## 2025-03-24 NOTE — ASSESSMENT & PLAN NOTE
Ordered routine annual labs [CBC, CMP, Lipid, TSH, A1c]  Counseled on lifestyle changes including diet and exercise  Routine screening cervical cytology w/ HPV co-testing  Orders:    Comprehensive Metabolic Panel; Future    CBC & Differential; Future    Lipid Panel With / Chol / HDL Ratio; Future

## 2025-03-24 NOTE — ASSESSMENT & PLAN NOTE
Orders:    Comprehensive Metabolic Panel; Future    CBC & Differential; Future    Lipid Panel With / Chol / HDL Ratio; Future

## 2025-03-24 NOTE — ASSESSMENT & PLAN NOTE
Differential includes BV, chlamydia, gonorrhea, trichomonas. Discussed that this is not likely due to retained Mirena as recent guidelines state they can be retained for 5-7 years. She has follow-up scheduled with OB/GYN for removal on 6/1/25. Added STD panel to pap smear collection and will share results once returned.  Orders:    LIQUID-BASED PAP SMEAR WITH HPV GENOTYPING REGARDLESS OF INTERPRETATION (ESTEVAN,COR,MAD); Future

## 2025-03-24 NOTE — ASSESSMENT & PLAN NOTE
"Recommended dilated eye exam as it has been several years since last exam. See \"dizziness\" above for further info.  Orders:    MRI Brain Without Contrast; Future    Ambulatory Referral to Neurology    "

## 2025-03-24 NOTE — ASSESSMENT & PLAN NOTE
Patient is experiencing recurrent episodes of unprovoked dizziness, blurry vision, and extreme fatigue occurring roughly monthly. Cardiac workup including EKG and Holter monitor has been wnl. Concern for hypoglycemic episodes, first-break MS, other primary neurological concerns. 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   Blood work ordered today  Ordered MRI, referral to neurological for continued workup.  Orders:    TSH Rfx On Abnormal To Free T4; Future    MRI Brain Without Contrast; Future    Ambulatory Referral to Neurology

## 2025-03-27 ENCOUNTER — RESULTS FOLLOW-UP (OUTPATIENT)
Dept: LAB | Facility: HOSPITAL | Age: 40
End: 2025-03-27
Payer: COMMERCIAL

## 2025-03-31 ENCOUNTER — TELEPHONE (OUTPATIENT)
Dept: FAMILY MEDICINE CLINIC | Facility: CLINIC | Age: 40
End: 2025-03-31
Payer: COMMERCIAL

## 2025-03-31 LAB — REF LAB TEST METHOD: NORMAL

## 2025-03-31 NOTE — TELEPHONE ENCOUNTER
Caller: Barbie Taylor    Relationship: Self    Best call back number: [phone]     Caller requesting test results: YES    What test was performed: PAP SMEAR    When was the test performed: 03/24/2025    Where was the test performed: IN OFFICE     Additional notes: PATIENT WOULD LIKE A CALL BACK WITH HER PAP SMEAR RESULTS.

## 2025-04-01 RX ORDER — METRONIDAZOLE 500 MG/1
500 TABLET ORAL 2 TIMES DAILY
Qty: 14 TABLET | Refills: 0 | Status: SHIPPED | OUTPATIENT
Start: 2025-04-01 | End: 2025-04-08

## 2025-05-31 PROBLEM — R42 DIZZINESS: Status: RESOLVED | Noted: 2025-03-24 | Resolved: 2025-05-31

## 2025-05-31 PROBLEM — Z01.419 WELL WOMAN EXAM: Status: ACTIVE | Noted: 2025-05-31

## 2025-05-31 PROBLEM — N89.8 VAGINAL DISCHARGE: Status: RESOLVED | Noted: 2025-03-24 | Resolved: 2025-05-31

## 2025-06-06 ENCOUNTER — PATIENT ROUNDING (BHMG ONLY) (OUTPATIENT)
Dept: OBSTETRICS AND GYNECOLOGY | Facility: CLINIC | Age: 40
End: 2025-06-06

## 2025-06-06 ENCOUNTER — OFFICE VISIT (OUTPATIENT)
Dept: OBSTETRICS AND GYNECOLOGY | Facility: CLINIC | Age: 40
End: 2025-06-06
Payer: COMMERCIAL

## 2025-06-06 VITALS
RESPIRATION RATE: 14 BRPM | DIASTOLIC BLOOD PRESSURE: 72 MMHG | WEIGHT: 140 LBS | SYSTOLIC BLOOD PRESSURE: 116 MMHG | BODY MASS INDEX: 24.81 KG/M2

## 2025-06-06 DIAGNOSIS — Z30.431 CHECKING OF INTRAUTERINE DEVICE: Primary | ICD-10-CM

## 2025-06-06 PROBLEM — H53.8 BLURRY VISION: Status: RESOLVED | Noted: 2025-03-24 | Resolved: 2025-06-06

## 2025-06-06 PROBLEM — G56.02 CARPAL TUNNEL SYNDROME OF LEFT WRIST: Status: ACTIVE | Noted: 2024-12-01

## 2025-06-06 PROBLEM — Z97.5 IUD (INTRAUTERINE DEVICE) IN PLACE: Status: ACTIVE | Noted: 2020-02-01

## 2025-06-06 PROCEDURE — 99202 OFFICE O/P NEW SF 15 MIN: CPT | Performed by: OBSTETRICS & GYNECOLOGY

## 2025-06-06 NOTE — PROGRESS NOTES
Subjective   Chief Complaint   Patient presents with    Gynecologic Exam       Barbieorestes Taylor is a 40 y.o. year old .  No LMP recorded. Patient has had an implant.  She comes to establish care.  She is a former patient of Dr. Murray.  She had a Mirena placed in .  Because it has been 5 years she comes to get it replaced.  She is having no issues or problems with it.  She did have a Pap smear recently that was normal.  Trichomonas was seen on the Pap and it was treated.    The following portions of the patient's history were reviewed and updated as appropriate:current medications, allergies, past family history, past medical history, past social history, and past surgical history    Social History    Tobacco Use      Smoking status: Former        Packs/day: 0.00        Types: Cigarettes, Cigars        Start date:         Quit date:         Years since quittin.4      Smokeless tobacco: Never         Objective   /72   Resp 14   Wt 63.5 kg (140 lb)   BMI 24.81 kg/m²     Lab Review   No data reviewed    Imaging   No data reviewed        Assessment   Overall doing well with IUD     Plan   Reviewed with Barbie that Mirena now has a 8-year indication  The importance of keeping all planned follow-up and taking all medications as prescribed was emphasized.  Follow up PRN             This note was electronically signed.    Abdiel Lambert M.D.  2025        Part of this note may be an electronic transcription/translation of spoken language to printed text using the Dragon Dictation System.

## 2025-06-06 NOTE — PROGRESS NOTES
My name is Deb, clinical manager    I am with [facility]  [facility]  [address]   [place] [address] [phone]    I want to officially welcome you to our practice and ask about your recent visit.    Tell me about your visit with us.  What things went well?    We're always looking for ways to make our patients' experiences even better. Do you have recommendations on way we may improve?    Overall were you satisfied with your first visit to our practice?    I appreciate you taking the time to answer a few questions today. Is there anything else I can do for you?    Thank you, and have a great day.